# Patient Record
Sex: FEMALE | Race: WHITE | Employment: UNEMPLOYED | URBAN - METROPOLITAN AREA
[De-identification: names, ages, dates, MRNs, and addresses within clinical notes are randomized per-mention and may not be internally consistent; named-entity substitution may affect disease eponyms.]

---

## 2018-08-28 ENCOUNTER — IP HISTORICAL/CONVERTED ENCOUNTER (OUTPATIENT)
Dept: OTHER | Age: 28
End: 2018-08-28

## 2020-04-09 ENCOUNTER — OFFICE VISIT (OUTPATIENT)
Dept: PRIMARY CARE CLINIC | Age: 30
End: 2020-04-09

## 2020-04-09 DIAGNOSIS — J40 BRONCHITIS: ICD-10-CM

## 2020-04-09 DIAGNOSIS — J45.41 MODERATE PERSISTENT ASTHMA WITH (ACUTE) EXACERBATION: ICD-10-CM

## 2020-04-09 DIAGNOSIS — R50.9 FEBRILE ILLNESS: Primary | ICD-10-CM

## 2020-04-09 LAB
FLUAV+FLUBV AG NOSE QL IA.RAPID: NEGATIVE POS/NEG
FLUAV+FLUBV AG NOSE QL IA.RAPID: NEGATIVE POS/NEG
S PYO AG THROAT QL: NEGATIVE
VALID INTERNAL CONTROL?: YES
VALID INTERNAL CONTROL?: YES

## 2020-04-09 RX ORDER — PREDNISONE 10 MG/1
TABLET ORAL
Qty: 21 TAB | Refills: 0
Start: 2020-04-09

## 2020-04-09 RX ORDER — CEFDINIR 300 MG/1
300 CAPSULE ORAL 2 TIMES DAILY
Qty: 20 CAP | Refills: 0
Start: 2020-04-09 | End: 2020-04-19

## 2020-04-09 NOTE — PROGRESS NOTES
Pt seen at [facility] flu clinic. See scanned note for HPI. Verbal consent obtained to treat and bill for services. Pt usually sees Dr. Dick Finnegan with [facility]. No chief complaint on file. she is a 27y.o. year old female who presents for evalution. Reviewed PmHx, RxHx, FmHx, SocHx, AllgHx and updated and dated in the chart. Review of Systems - negative except as listed above in the HPI    Objective: There were no vitals filed for this visit. Physical Examination: General appearance - alert, well appearing, and in no distress  Eyes - pupils equal and reactive, extraocular eye movements intact  Ears - bilateral TM's and external ear canals normal  Nose - mucosal congestion, mucosal pallor, clear rhinorrhea and sinuses normal and nontender  Mouth - mucous membranes moist, pharynx normal without lesions  Neck - supple, no significant adenopathy  Chest - no tachypnea, retractions or cyanosis, wheezing noted on early inspiration and late expiration in all lung fields with no other adventitious lung sounds heard on auscultation, productive sounding cough  Heart - normal rate, regular rhythm, normal S1, S2, no murmurs    Assessment/ Plan:   Diagnoses and all orders for this visit:    1. Febrile illness  -     AMB POC BRIGID INFLUENZA A/B TEST  -     AMB POC RAPID STREP A  Neg flu and strep. Discussed that if sx persist or worsen to seek more immediate medical attention so that she can be tested for covid. Low suspicion due to no known exposure and lack of other concerning s/sx. 2. Moderate persistent asthma with (acute) exacerbation  -     predniSONE (STERAPRED DS) 10 mg dose pack; See administration instruction per 10mg dose pack  Complete as directed. Pt has type 2 diabetes. Can monitor glucose closely at home with meter. Call Dr. Misael Vazquez office if any high readings. Maintain hydration with water and follow a low carb diet. 3. Bronchitis  -     cefdinir (OMNICEF) 300 mg capsule;  Take 1 Cap by mouth two (2) times a day for 10 days. Start and complete full course of omnicef. Dwp ADRs/SEs of medication. Push fluids. Rest. Saline nasal spray for nasal congestion. OTC motrin/apap for fevers. RTC if sx persist or worsen. I have discussed the diagnosis with the patient and the intended plan as seen in the above orders. The patient has received an after-visit summary and questions were answered concerning future plans. Medication Side Effects and Warnings were discussed with patient: yes  Patient Labs were reviewed and or requested: yes  Patient Past Records were reviewed and or requested  yes  Patient / Caregiver Understanding of treatment plan was verbalized during office visit YES    IVETT Manriquez    There are no Patient Instructions on file for this visit.

## 2020-04-13 ENCOUNTER — OFFICE VISIT (OUTPATIENT)
Dept: PRIMARY CARE CLINIC | Age: 30
End: 2020-04-13

## 2020-04-13 DIAGNOSIS — B34.9 VIRAL ILLNESS: Primary | ICD-10-CM

## 2020-04-13 NOTE — PROGRESS NOTES
Pt seen at [facility] flu clinic. See scanned note for HPI. Verbal consent obtained to treat and bill for services.

## 2020-04-14 LAB — SARS-COV-2, NAA: NOT DETECTED

## 2020-04-15 NOTE — PROGRESS NOTES
Attempted to reach pt. Patient x2 id verified. Notified result to pt per np leonard rodriguez verbalized understanding.

## 2020-04-21 ENCOUNTER — PATIENT OUTREACH (OUTPATIENT)
Dept: INTERNAL MEDICINE CLINIC | Age: 30
End: 2020-04-21

## 2020-04-21 NOTE — PROGRESS NOTES
4/21/2020 Patient is currently enrolled in a remote symptom monitoring program.  Start day 4/14/2020, End Date 4/29/2020. Red alert was triggered at 9:44 pm on on 4/20/2020. Patient was contacted at 9:35am on 4/21/2020. Patient is doing well today. She is negative for COVID-19 virus. She does have asthma and seasonal allergies. She is not SOB today and is at her baseline during this elevated pollen time. She completed medication prescribed at flu clinic. Will continue to monitor.  PAC